# Patient Record
Sex: FEMALE | Race: WHITE | Employment: FULL TIME | URBAN - METROPOLITAN AREA
[De-identification: names, ages, dates, MRNs, and addresses within clinical notes are randomized per-mention and may not be internally consistent; named-entity substitution may affect disease eponyms.]

---

## 2019-03-07 ENCOUNTER — OFFICE VISIT (OUTPATIENT)
Dept: ORTHOPEDIC SURGERY | Age: 54
End: 2019-03-07
Payer: COMMERCIAL

## 2019-03-07 VITALS — WEIGHT: 215 LBS | HEIGHT: 67 IN | BODY MASS INDEX: 33.74 KG/M2

## 2019-03-07 DIAGNOSIS — T84.84XA PAIN DUE TO INTERNAL ORTHOPEDIC PROSTHETIC DEVICE, INITIAL ENCOUNTER (HCC): Primary | ICD-10-CM

## 2019-03-07 PROBLEM — N64.9 BREAST DISORDER: Status: ACTIVE | Noted: 2019-03-07

## 2019-03-07 PROBLEM — M79.10 MYALGIA: Status: ACTIVE | Noted: 2017-05-26

## 2019-03-07 PROBLEM — F32.A DEPRESSION: Status: ACTIVE | Noted: 2017-05-25

## 2019-03-07 PROBLEM — E03.9 HYPOTHYROIDISM: Status: ACTIVE | Noted: 2019-03-07

## 2019-03-07 PROBLEM — M54.9 BACKACHE: Status: ACTIVE | Noted: 2019-03-07

## 2019-03-07 PROBLEM — R06.09 DOE (DYSPNEA ON EXERTION): Status: ACTIVE | Noted: 2019-03-07

## 2019-03-07 PROBLEM — N83.209 CYST OF OVARY: Status: ACTIVE | Noted: 2017-03-07

## 2019-03-07 PROBLEM — K21.9 GASTROESOPHAGEAL REFLUX DISEASE: Status: ACTIVE | Noted: 2017-05-25

## 2019-03-07 PROBLEM — E11.9 DIABETES MELLITUS TYPE 2, CONTROLLED (HCC): Status: ACTIVE | Noted: 2019-03-07

## 2019-03-07 PROBLEM — M19.90 ARTHRITIS: Status: ACTIVE | Noted: 2019-03-07

## 2019-03-07 PROBLEM — R53.83 FATIGUE: Status: ACTIVE | Noted: 2017-05-26

## 2019-03-07 PROBLEM — N32.81 OVERACTIVE BLADDER: Status: ACTIVE | Noted: 2017-05-25

## 2019-03-07 PROBLEM — H54.7 VISUAL IMPAIRMENT: Status: ACTIVE | Noted: 2019-03-07

## 2019-03-07 PROBLEM — E89.0 POSTOPERATIVE HYPOTHYROIDISM: Status: ACTIVE | Noted: 2017-04-10

## 2019-03-07 PROBLEM — I26.99 PULMONARY EMBOLISM (HCC): Status: ACTIVE | Noted: 2017-03-01

## 2019-03-07 PROBLEM — Z86.711 HISTORY OF PULMONARY EMBOLISM: Status: ACTIVE | Noted: 2017-06-14

## 2019-03-07 PROBLEM — E55.9 VITAMIN D DEFICIENCY: Status: ACTIVE | Noted: 2017-06-26

## 2019-03-07 PROBLEM — M21.40 FLAT FOOT: Status: ACTIVE | Noted: 2017-06-14

## 2019-03-07 PROBLEM — Z86.19 HISTORY OF TICK-BORNE DISEASE: Status: ACTIVE | Noted: 2017-05-26

## 2019-03-07 PROBLEM — M17.11 PRIMARY OSTEOARTHRITIS OF RIGHT KNEE: Status: ACTIVE | Noted: 2018-11-21

## 2019-03-07 PROBLEM — I82.409 DEEP VEIN THROMBOSIS (HCC): Status: ACTIVE | Noted: 2017-03-01

## 2019-03-07 PROBLEM — Z86.718 HISTORY OF DEEP VENOUS THROMBOSIS: Status: ACTIVE | Noted: 2019-03-07

## 2019-03-07 PROBLEM — M25.50 PAIN IN JOINT: Status: ACTIVE | Noted: 2017-05-26

## 2019-03-07 PROBLEM — S83.241S ACUTE MEDIAL MENISCAL TEAR, RIGHT, SEQUELA: Status: ACTIVE | Noted: 2018-08-29

## 2019-03-07 PROBLEM — N92.0 MENORRHAGIA: Status: ACTIVE | Noted: 2019-03-07

## 2019-03-07 PROBLEM — H26.9 CATARACT: Status: ACTIVE | Noted: 2019-03-07

## 2019-03-07 PROBLEM — N63.0 BREAST NODULE: Status: ACTIVE | Noted: 2019-03-07

## 2019-03-07 PROBLEM — M25.561 RIGHT KNEE PAIN: Status: ACTIVE | Noted: 2019-03-07

## 2019-03-07 PROBLEM — D25.9 UTERINE LEIOMYOMA: Status: ACTIVE | Noted: 2017-03-07

## 2019-03-07 PROBLEM — S39.92XA INJURY OF BACK: Status: ACTIVE | Noted: 2019-03-07

## 2019-03-07 PROBLEM — G43.909 MIGRAINE HEADACHE: Status: ACTIVE | Noted: 2017-05-25

## 2019-03-07 PROBLEM — R20.0 NUMBNESS AND TINGLING: Status: ACTIVE | Noted: 2017-05-26

## 2019-03-07 PROBLEM — R00.2 PALPITATIONS: Status: ACTIVE | Noted: 2017-05-26

## 2019-03-07 PROBLEM — G47.19 EXCESSIVE DAYTIME SLEEPINESS: Status: ACTIVE | Noted: 2019-03-07

## 2019-03-07 PROBLEM — D64.9 ANEMIA: Status: ACTIVE | Noted: 2019-03-07

## 2019-03-07 PROBLEM — R20.2 NUMBNESS AND TINGLING: Status: ACTIVE | Noted: 2017-05-26

## 2019-03-07 PROCEDURE — 99204 OFFICE O/P NEW MOD 45 MIN: CPT | Performed by: ORTHOPAEDIC SURGERY

## 2019-03-07 RX ORDER — ASPIRIN 81 MG/1
81 TABLET, CHEWABLE ORAL
COMMUNITY

## 2019-03-07 RX ORDER — SERTRALINE HYDROCHLORIDE 100 MG/1
TABLET, FILM COATED ORAL
COMMUNITY

## 2019-03-07 RX ORDER — ESOMEPRAZOLE MAGNESIUM 40 MG/1
CAPSULE, DELAYED RELEASE ORAL
COMMUNITY
Start: 2017-05-25

## 2019-03-07 RX ORDER — BIOTIN 10000 MCG
1 CAPSULE ORAL
COMMUNITY

## 2019-03-07 RX ORDER — METFORMIN HYDROCHLORIDE 500 MG/1
TABLET, EXTENDED RELEASE ORAL
Refills: 0 | COMMUNITY
Start: 2019-02-22

## 2019-03-07 RX ORDER — TOPIRAMATE 50 MG/1
TABLET, FILM COATED ORAL
COMMUNITY

## 2019-03-07 RX ORDER — LEVOTHYROXINE SODIUM 175 UG/1
TABLET ORAL
COMMUNITY
Start: 2017-05-25

## 2019-03-07 SDOH — HEALTH STABILITY: MENTAL HEALTH: HOW OFTEN DO YOU HAVE A DRINK CONTAINING ALCOHOL?: NEVER

## 2019-03-11 LAB
ABSOLUTE IMMATURE GRANULOCYTE: 0 10*3/UL (ref 0–0.2)
BASOPHILS ABSOLUTE: 0.1 10*3/UL (ref 0–0.2)
BASOPHILS RELATIVE PERCENT: 1 % (ref 0–1)
C-REACTIVE PROTEIN: 3.2 MG/L (ref 0–7)
EOSINOPHILS ABSOLUTE: 0.4 10*3/UL (ref 0–0.5)
EOSINOPHILS RELATIVE PERCENT: 6.9 % (ref 0–6)
HCT VFR BLD CALC: 39.2 % (ref 36–45)
HEMOGLOBIN: 12.8 G/DL (ref 12–15)
IMMATURE GRANULOCYTES: 0.2 % (ref 0–1)
LYMPHOCYTES ABSOLUTE: 1.6 10*3/UL (ref 1.2–4)
LYMPHOCYTES RELATIVE PERCENT: 32.1 % (ref 20–45)
MCH RBC QN AUTO: 27.2 PG (ref 27–33)
MCHC RBC AUTO-ENTMCNC: 32.7 G/DL (ref 32–35)
MCV RBC AUTO: 83.4 FL (ref 82–98)
MONOCYTES ABSOLUTE: 0.4 10*3/UL (ref 0.1–1)
MONOCYTES RELATIVE PERCENT: 8.1 % (ref 5–12)
NEUTROPHILS ABSOLUTE: 2.6 10*3/UL (ref 1.6–7.6)
NEUTROPHILS RELATIVE PERCENT: 51.7 % (ref 40–72)
NUCLEATED RED BLOOD CELLS: 0 % (ref 0–0)
PDW BLD-RTO: 13.1 % (ref 11.5–15)
PLATELET # BLD: 201 10*3/UL (ref 150–400)
RBC: 4.7 10*6/UL (ref 3.8–5)
SEDIMENTATION RATE, ERYTHROCYTE: 12 MM/HR (ref 0–20)
WBC: 5.08 10*3/UL (ref 4–10.6)

## 2022-12-20 ENCOUNTER — ANESTHESIA EVENT (OUTPATIENT)
Dept: OPERATING ROOM | Age: 57
End: 2022-12-20
Payer: COMMERCIAL

## 2022-12-20 ENCOUNTER — HOSPITAL ENCOUNTER (OUTPATIENT)
Age: 57
Setting detail: OUTPATIENT SURGERY
Discharge: HOME | End: 2022-12-20
Attending: OPHTHALMOLOGY | Admitting: OPHTHALMOLOGY
Payer: COMMERCIAL

## 2022-12-20 ENCOUNTER — ANESTHESIA (OUTPATIENT)
Dept: OPERATING ROOM | Age: 57
End: 2022-12-20
Payer: COMMERCIAL

## 2022-12-20 VITALS
HEIGHT: 67 IN | DIASTOLIC BLOOD PRESSURE: 74 MMHG | HEART RATE: 74 BPM | RESPIRATION RATE: 16 BRPM | OXYGEN SATURATION: 96 % | SYSTOLIC BLOOD PRESSURE: 138 MMHG | TEMPERATURE: 97.2 F | WEIGHT: 220 LBS | BODY MASS INDEX: 34.53 KG/M2

## 2022-12-20 PROBLEM — H43.391 VITREOUS OPACITIES OF RIGHT EYE: Chronic | Status: ACTIVE | Noted: 2022-12-20

## 2022-12-20 LAB
EGFR, POC: >60 ML/MIN/1.73M2
GLUCOSE BLD-MCNC: 99 MG/DL (ref 74–100)
POC BUN: 26 MG/DL (ref 8–26)
POC CREATININE: 0.75 MG/DL (ref 0.51–1.19)

## 2022-12-20 PROCEDURE — 3700000000 HC ANESTHESIA ATTENDED CARE: Performed by: OPHTHALMOLOGY

## 2022-12-20 PROCEDURE — 82565 ASSAY OF CREATININE: CPT

## 2022-12-20 PROCEDURE — 2500000003 HC RX 250 WO HCPCS: Performed by: OPHTHALMOLOGY

## 2022-12-20 PROCEDURE — 2580000003 HC RX 258: Performed by: NURSE ANESTHETIST, CERTIFIED REGISTERED

## 2022-12-20 PROCEDURE — 6370000000 HC RX 637 (ALT 250 FOR IP): Performed by: OPHTHALMOLOGY

## 2022-12-20 PROCEDURE — 3700000001 HC ADD 15 MINUTES (ANESTHESIA): Performed by: OPHTHALMOLOGY

## 2022-12-20 PROCEDURE — 7100000011 HC PHASE II RECOVERY - ADDTL 15 MIN: Performed by: OPHTHALMOLOGY

## 2022-12-20 PROCEDURE — 6360000002 HC RX W HCPCS: Performed by: OPHTHALMOLOGY

## 2022-12-20 PROCEDURE — 2580000003 HC RX 258: Performed by: OPHTHALMOLOGY

## 2022-12-20 PROCEDURE — 7100000010 HC PHASE II RECOVERY - FIRST 15 MIN: Performed by: OPHTHALMOLOGY

## 2022-12-20 PROCEDURE — 2500000003 HC RX 250 WO HCPCS: Performed by: NURSE ANESTHETIST, CERTIFIED REGISTERED

## 2022-12-20 PROCEDURE — 2580000003 HC RX 258: Performed by: STUDENT IN AN ORGANIZED HEALTH CARE EDUCATION/TRAINING PROGRAM

## 2022-12-20 PROCEDURE — 6360000002 HC RX W HCPCS: Performed by: NURSE ANESTHETIST, CERTIFIED REGISTERED

## 2022-12-20 PROCEDURE — 84520 ASSAY OF UREA NITROGEN: CPT

## 2022-12-20 PROCEDURE — 2709999900 HC NON-CHARGEABLE SUPPLY: Performed by: OPHTHALMOLOGY

## 2022-12-20 PROCEDURE — 82947 ASSAY GLUCOSE BLOOD QUANT: CPT

## 2022-12-20 PROCEDURE — 3600000004 HC SURGERY LEVEL 4 BASE: Performed by: OPHTHALMOLOGY

## 2022-12-20 PROCEDURE — 3600000014 HC SURGERY LEVEL 4 ADDTL 15MIN: Performed by: OPHTHALMOLOGY

## 2022-12-20 RX ORDER — SODIUM CHLORIDE 0.9 % (FLUSH) 0.9 %
5-40 SYRINGE (ML) INJECTION PRN
Status: DISCONTINUED | OUTPATIENT
Start: 2022-12-20 | End: 2022-12-20 | Stop reason: HOSPADM

## 2022-12-20 RX ORDER — SODIUM CHLORIDE, SODIUM LACTATE, POTASSIUM CHLORIDE, CALCIUM CHLORIDE 600; 310; 30; 20 MG/100ML; MG/100ML; MG/100ML; MG/100ML
INJECTION, SOLUTION INTRAVENOUS CONTINUOUS PRN
Status: DISCONTINUED | OUTPATIENT
Start: 2022-12-20 | End: 2022-12-20 | Stop reason: SDUPTHER

## 2022-12-20 RX ORDER — TOBRAMYCIN AND DEXAMETHASONE 3; 1 MG/ML; MG/ML
1 SUSPENSION/ DROPS OPHTHALMIC ONCE
Status: COMPLETED | OUTPATIENT
Start: 2022-12-20 | End: 2022-12-20

## 2022-12-20 RX ORDER — WATER 1000 ML/1000ML
INJECTION, SOLUTION INTRAVENOUS PRN
Status: DISCONTINUED | OUTPATIENT
Start: 2022-12-20 | End: 2022-12-20 | Stop reason: HOSPADM

## 2022-12-20 RX ORDER — FENTANYL CITRATE 50 UG/ML
INJECTION, SOLUTION INTRAMUSCULAR; INTRAVENOUS PRN
Status: DISCONTINUED | OUTPATIENT
Start: 2022-12-20 | End: 2022-12-20 | Stop reason: SDUPTHER

## 2022-12-20 RX ORDER — SODIUM CHLORIDE, SODIUM LACTATE, POTASSIUM CHLORIDE, CALCIUM CHLORIDE 600; 310; 30; 20 MG/100ML; MG/100ML; MG/100ML; MG/100ML
INJECTION, SOLUTION INTRAVENOUS CONTINUOUS
Status: DISCONTINUED | OUTPATIENT
Start: 2022-12-20 | End: 2022-12-20 | Stop reason: HOSPADM

## 2022-12-20 RX ORDER — TROPICAMIDE 10 MG/ML
1 SOLUTION/ DROPS OPHTHALMIC EVERY 5 MIN PRN
Status: COMPLETED | OUTPATIENT
Start: 2022-12-20 | End: 2022-12-20

## 2022-12-20 RX ORDER — PHENYLEPHRINE HYDROCHLORIDE 100 MG/ML
1 SOLUTION/ DROPS OPHTHALMIC EVERY 5 MIN PRN
Status: COMPLETED | OUTPATIENT
Start: 2022-12-20 | End: 2022-12-20

## 2022-12-20 RX ORDER — BALANCED SALT SOLUTION ENRICHED WITH BICARBONATE, DEXTROSE, AND GLUTATHIONE
KIT INTRAOCULAR PRN
Status: DISCONTINUED | OUTPATIENT
Start: 2022-12-20 | End: 2022-12-20 | Stop reason: HOSPADM

## 2022-12-20 RX ORDER — LIDOCAINE HYDROCHLORIDE 10 MG/ML
INJECTION, SOLUTION INFILTRATION; PERINEURAL PRN
Status: DISCONTINUED | OUTPATIENT
Start: 2022-12-20 | End: 2022-12-20 | Stop reason: SDUPTHER

## 2022-12-20 RX ORDER — ONDANSETRON 2 MG/ML
4 INJECTION INTRAMUSCULAR; INTRAVENOUS
Status: DISCONTINUED | OUTPATIENT
Start: 2022-12-20 | End: 2022-12-20 | Stop reason: HOSPADM

## 2022-12-20 RX ORDER — SODIUM CHLORIDE 9 MG/ML
INJECTION, SOLUTION INTRAVENOUS PRN
Status: DISCONTINUED | OUTPATIENT
Start: 2022-12-20 | End: 2022-12-20 | Stop reason: HOSPADM

## 2022-12-20 RX ORDER — SODIUM CHLORIDE 0.9 % (FLUSH) 0.9 %
5-40 SYRINGE (ML) INJECTION EVERY 12 HOURS SCHEDULED
Status: DISCONTINUED | OUTPATIENT
Start: 2022-12-20 | End: 2022-12-20 | Stop reason: HOSPADM

## 2022-12-20 RX ORDER — MIDAZOLAM HYDROCHLORIDE 1 MG/ML
INJECTION INTRAMUSCULAR; INTRAVENOUS PRN
Status: DISCONTINUED | OUTPATIENT
Start: 2022-12-20 | End: 2022-12-20 | Stop reason: SDUPTHER

## 2022-12-20 RX ORDER — PROPOFOL 10 MG/ML
INJECTION, EMULSION INTRAVENOUS PRN
Status: DISCONTINUED | OUTPATIENT
Start: 2022-12-20 | End: 2022-12-20 | Stop reason: SDUPTHER

## 2022-12-20 RX ORDER — HYDRALAZINE HYDROCHLORIDE 20 MG/ML
10 INJECTION INTRAMUSCULAR; INTRAVENOUS
Status: DISCONTINUED | OUTPATIENT
Start: 2022-12-20 | End: 2022-12-20 | Stop reason: HOSPADM

## 2022-12-20 RX ORDER — CYCLOPENTOLATE HYDROCHLORIDE 10 MG/ML
SOLUTION/ DROPS OPHTHALMIC PRN
Status: DISCONTINUED | OUTPATIENT
Start: 2022-12-20 | End: 2022-12-20 | Stop reason: HOSPADM

## 2022-12-20 RX ORDER — ERYTHROMYCIN 5 MG/G
OINTMENT OPHTHALMIC PRN
Status: DISCONTINUED | OUTPATIENT
Start: 2022-12-20 | End: 2022-12-20 | Stop reason: HOSPADM

## 2022-12-20 RX ORDER — CEFTAZIDIME 1 G/1
INJECTION, POWDER, FOR SOLUTION INTRAMUSCULAR; INTRAVENOUS PRN
Status: DISCONTINUED | OUTPATIENT
Start: 2022-12-20 | End: 2022-12-20 | Stop reason: HOSPADM

## 2022-12-20 RX ADMIN — PHENYLEPHRINE HYDROCHLORIDE 1 DROP: 100 SOLUTION/ DROPS OPHTHALMIC at 08:28

## 2022-12-20 RX ADMIN — SODIUM CHLORIDE, POTASSIUM CHLORIDE, SODIUM LACTATE AND CALCIUM CHLORIDE: 600; 310; 30; 20 INJECTION, SOLUTION INTRAVENOUS at 08:50

## 2022-12-20 RX ADMIN — PHENYLEPHRINE HYDROCHLORIDE 1 DROP: 100 SOLUTION/ DROPS OPHTHALMIC at 08:46

## 2022-12-20 RX ADMIN — MIDAZOLAM 1 MG: 1 INJECTION INTRAMUSCULAR; INTRAVENOUS at 09:56

## 2022-12-20 RX ADMIN — TROPICAMIDE 1 DROP: 10 SOLUTION/ DROPS OPHTHALMIC at 08:36

## 2022-12-20 RX ADMIN — TROPICAMIDE 1 DROP: 10 SOLUTION/ DROPS OPHTHALMIC at 08:46

## 2022-12-20 RX ADMIN — TROPICAMIDE 1 DROP: 10 SOLUTION/ DROPS OPHTHALMIC at 08:28

## 2022-12-20 RX ADMIN — FENTANYL CITRATE 50 MCG: 50 INJECTION, SOLUTION INTRAMUSCULAR; INTRAVENOUS at 09:56

## 2022-12-20 RX ADMIN — PROPOFOL 50 MG: 10 INJECTION, EMULSION INTRAVENOUS at 09:44

## 2022-12-20 RX ADMIN — FENTANYL CITRATE 50 MCG: 50 INJECTION, SOLUTION INTRAMUSCULAR; INTRAVENOUS at 10:04

## 2022-12-20 RX ADMIN — SODIUM CHLORIDE, POTASSIUM CHLORIDE, SODIUM LACTATE AND CALCIUM CHLORIDE: 600; 310; 30; 20 INJECTION, SOLUTION INTRAVENOUS at 09:39

## 2022-12-20 RX ADMIN — PHENYLEPHRINE HYDROCHLORIDE 1 DROP: 100 SOLUTION/ DROPS OPHTHALMIC at 08:36

## 2022-12-20 RX ADMIN — LIDOCAINE HYDROCHLORIDE 50 MG: 10 INJECTION, SOLUTION INFILTRATION; PERINEURAL at 09:44

## 2022-12-20 RX ADMIN — TOBRAMYCIN AND DEXAMETHASONE 1 DROP: 1; 3 SUSPENSION/ DROPS OPHTHALMIC at 08:46

## 2022-12-20 ASSESSMENT — PAIN - FUNCTIONAL ASSESSMENT: PAIN_FUNCTIONAL_ASSESSMENT: 0-10

## 2022-12-20 NOTE — ANESTHESIA PRE PROCEDURE
Department of Anesthesiology  Preprocedure Note       Name:  Nitin Swift   Age:  59 y.o.  :  1965                                          MRN:  [id number]         Date:  2022      Surgeon: Brandon Christie):  Maribell Anand MD    Procedure: Procedure(s):  VITRECTOMY 25 GAUGE    Medications prior to admission:   Prior to Admission medications    Medication Sig Start Date End Date Taking? Authorizing Provider   celecoxib (CELEBREX) 200 MG capsule Take 200 mg by mouth daily 22   Historical Provider, MD   citalopram (CELEXA) 40 MG tablet Take 40 mg by mouth daily    Historical Provider, MD   sulfaSALAzine (AZULFIDINE) 500 MG tablet Take 500 mg by mouth 4 times daily    Historical Provider, MD   DULoxetine (CYMBALTA) 30 MG extended release capsule Take 30 mg by mouth daily    Historical Provider, MD   aspirin 81 MG chewable tablet Take 162 mg by mouth daily    Historical Provider, MD   Biotin 10 MG CAPS Take 1 capsule by mouth  Patient not taking: Reported on 2022    Historical Provider, MD   esomeprazole (NEXIUM) 40 MG delayed release capsule Nexium 40 mg capsule,delayed release   Take 1 capsule every day by oral route. 17   Historical Provider, MD   levothyroxine (SYNTHROID) 150 MCG tablet Take 150 mcg by mouth Daily 17   Historical Provider, MD   metFORMIN (GLUCOPHAGE-XR) 500 MG extended release tablet  19   Historical Provider, MD   sertraline (ZOLOFT) 100 MG tablet Zoloft 100 mg tablet   Take 1 tablet every day by oral route. Patient not taking: Reported on 2022    Historical Provider, MD   topiramate (TOPAMAX) 50 MG tablet Topamax 50 mg tablet   Take 1 tablet twice a day by oral route. Historical Provider, MD       Current medications:    No current facility-administered medications for this encounter.      Current Outpatient Medications   Medication Sig Dispense Refill    celecoxib (CELEBREX) 200 MG capsule Take 200 mg by mouth daily      citalopram (CELEXA) 40 MG tablet Take 40 mg by mouth daily      sulfaSALAzine (AZULFIDINE) 500 MG tablet Take 500 mg by mouth 4 times daily      DULoxetine (CYMBALTA) 30 MG extended release capsule Take 30 mg by mouth daily      aspirin 81 MG chewable tablet Take 162 mg by mouth daily      Biotin 10 MG CAPS Take 1 capsule by mouth (Patient not taking: Reported on 12/16/2022)      esomeprazole (NEXIUM) 40 MG delayed release capsule Nexium 40 mg capsule,delayed release   Take 1 capsule every day by oral route.  levothyroxine (SYNTHROID) 150 MCG tablet Take 150 mcg by mouth Daily      metFORMIN (GLUCOPHAGE-XR) 500 MG extended release tablet  (Patient not taking: Reported on 12/16/2022)  0    sertraline (ZOLOFT) 100 MG tablet Zoloft 100 mg tablet   Take 1 tablet every day by oral route. (Patient not taking: Reported on 12/16/2022)      topiramate (TOPAMAX) 50 MG tablet Topamax 50 mg tablet   Take 1 tablet twice a day by oral route. Allergies:  No Known Allergies    Problem List:    Patient Active Problem List   Diagnosis Code    Acute medial meniscal tear, right, sequela S83.241S    Acute pain of right shoulder M25.511    Anemia D64.9    Arthritis M19.90    Backache M54.9    Breast disorder N64.9    Breast nodule N63.0    Calculus of gallbladder with cholecystitis K80.10    Cataract H26.9    Cyst of ovary N83.209    Deep vein thrombosis ([facility] [address] I82.409    Depression F32. A    Diabetes mellitus type 2, controlled ([facility] [address] E11.9    NICOLAS (dyspnea on exertion) R06.09    Eczema L30.9    Excessive daytime sleepiness G47.19    Fatigue R53.83    Flat foot M21.40    Gastroesophageal reflux disease K21.9    History of deep venous thrombosis Z86.718    History of pulmonary embolism Z86.711    History of tick-borne disease Z86.19    Hypothyroidism E03.9    Impingement syndrome of right shoulder M75.41    Injury of back S39. 92XA    Menorrhagia N92.0    Migraine headache G43.909    Pain in joint M25.50    Myalgia M79.10    Nausea R11.0    Numbness and tingling R20.0, R20.2    Obesity E66.9    Overactive bladder N32.81    Palpitations R00.2    Postoperative hypothyroidism E89.0    Primary osteoarthritis of right knee M17.11    Pulmonary embolism (HCC) I26.99    Right knee pain M25.561    Right upper quadrant pain R10.11    Subacromial bursitis M75.50    Tendinopathy of right rotator cuff M67.911    Uterine leiomyoma D25.9    Visual impairment H54.7    Vitamin D deficiency E55.9       Past Medical History:        Diagnosis Date    Acid reflux     Arthritis     Back pain     Breast nodule     breast tag    Bursitis of shoulder region     bilat    Carpal tunnel syndrome     bilat    Cataract     Depression     Floaters     History of [place] spotted fever 1995    tick borne illness    Hx of blood clots 2015    DVT and PE after surgery    Leg pain     Migraine     ALO on CPAP 11/23/2022    has not used CPAP for past month    Ovarian cyst 2015    Overactive bladder     Spinal stenosis     Thyroid disease     Torn meniscus     Under care of service provider 12/16/2022    rheumatology-[facility]-last visit april 2022    Under care of service provider 12/16/2022    [facility]-last visit sep 2022    Under care of service provider 12/16/2022    vascular-[facility]-last visit oct 2022    Under care of service provider 12/16/2022    ortho-[facility]-last visit oct 2022    Uterine leiomyoma 2015    Venous insufficiency     Visual disturbance     cloudy vision       Past Surgical History:        Procedure Laterality Date    CATARACT REMOVAL  2021    CHOLECYSTECTOMY  2013    COLONOSCOPY      ENDOSCOPY, COLON, DIAGNOSTIC      JOINT REPLACEMENT Right 2018    knee    JOINT REPLACEMENT Left 05/2020    knee    KNEE CARTILAGE SURGERY Right 2018    repair of torn meniscus    KNEE CARTILAGE SURGERY Left 11/2019    torn meniscus    SINUS SURGERY  1999    THYROIDECTOMY  2010    TUBAL LIGATION  1993    UTERINE FIBROID EMBOLIZATION  2015       Social History:    Social History     Tobacco Use    Smoking status: Never    Smokeless tobacco: Never   Substance Use Topics    Alcohol use: Not Currently                                Counseling given: Not Answered      Vital Signs (Current):   Vitals:    12/16/22 1035   Weight: 220 lb (99.8 kg)   Height: 5' 7\" (1.702 m)                                              BP Readings from Last 3 Encounters:   No data found for BP       NPO Status:                                                                                 BMI:   Wt Readings from Last 3 Encounters:   03/07/19 215 lb (97.5 kg)     Body mass index is 34.46 kg/m². CBC:   Lab Results   Component Value Date/Time    WBC 4.40 05/11/2020 02:08 PM    RBC 4.83 05/11/2020 02:08 PM    HGB 13.2 05/11/2020 02:08 PM    HCT 41.6 05/11/2020 02:08 PM    MCV 86.1 05/11/2020 02:08 PM    RDW 12.5 05/11/2020 02:08 PM     05/11/2020 02:08 PM       CMP:   Lab Results   Component Value Date/Time     05/11/2020 02:08 PM    K 3.9 05/11/2020 02:08 PM     05/11/2020 02:08 PM    CO2 22 05/11/2020 02:08 PM    BUN 10 05/11/2020 02:08 PM    CREATININE 0.89 05/11/2020 02:08 PM    GLUCOSE 99 05/11/2020 02:08 PM    PROT 6.9 10/18/2019 02:38 PM    CALCIUM 9.2 05/11/2020 02:08 PM    BILITOT 0.6 10/18/2019 02:38 PM    ALKPHOS 85 10/18/2019 02:38 PM    AST 19 10/18/2019 02:38 PM    ALT 18 10/18/2019 02:38 PM       POC Tests: No results for input(s): POCGLU, POCNA, POCK, POCCL, POCBUN, POCHEMO, POCHCT in the last 72 hours.     Coags:   Lab Results   Component Value Date/Time    PROTIME 13.4 05/11/2020 02:08 PM    INR 1.02 05/11/2020 02:08 PM    APTT 25.4 05/11/2020 02:08 PM       HCG (If Applicable): No results found for: PREGTESTUR, PREGSERUM, HCG, HCGQUANT     ABGs: No results found for: PHART, PO2ART, OGT4NRU, HDP1TWO, BEART, Z6WSAZYX     Type & Screen (If Applicable):  No results found for: LABABO, LABRH    Drug/Infectious Status (If Applicable):  No results found for: HIV, HEPCAB    COVID-19 Screening (If Applicable):   Lab Results   Component Value Date/Time    COVID19 Not Detected 05/11/2020 02:07 PM           Anesthesia Evaluation  Patient summary reviewed and Nursing notes reviewed no history of anesthetic complications:   Airway: Mallampati: II  TM distance: >3 FB   Neck ROM: full  Mouth opening: > = 3 FB   Dental: normal exam         Pulmonary: breath sounds clear to auscultation  (+) sleep apnea:                             Cardiovascular:            Rhythm: regular  Rate: normal                    Neuro/Psych:   (+) neuromuscular disease:, headaches:, psychiatric history:            GI/Hepatic/Renal:   (+) GERD:,           Endo/Other:    (+) hypothyroidism::., .                 Abdominal:             Vascular:   + DVT (hx of), PE (hx of). Other Findings:           Anesthesia Plan      MAC     ASA 3       Induction: intravenous. Anesthetic plan and risks discussed with patient. Plan discussed with CRNA.                     Murphy Shields MD   12/20/2022

## 2022-12-20 NOTE — OP NOTE
Operative Note      Patient: Radha Guzmán  YOB: 1965  MRN: [id number]    Date of Procedure: 12/20/2022      PREOP Diagnosis:  Vitreous Opacities OD    POSTOP Diagnosis:  Same    Procedure:  Vitrectomy OD    Anesthesia:  MAC    Surgeon:  Jane Raygoza    EBL:  Minimal    Fluids:  See anesthesia record    Drains:  None    Specimen:  None    Complications:  None    Reason for operation:  The patient has significant vitreous opacities that are interfering with daily activities. Patient wishes to have surgery to remove opacities in anticipation of better vision. Patient understands risks of surgery include, but are not limited to, bleeding, infection and retinal detachment. Procedure: The patient was brought to the operating room in good condition. Transient Propofol was given. Retrobulbar and topical anaesthesia were administered. The patient was prepped and draped in the usual manner. 25 gauge vitrectomy trocars were inserted in the usual quadrants. Infusion was inserted in the inferior temporal quadrant. Light pipe and ocutome placed through the superior trocars. Vitreous was removed from anterior to posterior and trimmed out past the equator in all quadrants. No significant bleeding occurred at any time. No tears or detachment were found with scleral depression. The trocars were removed and sutured if there any leakage. Subtenon's antibiotic was injected in the inferior nasal quadrant. The eye was patched in the usual fashion and the patient taken to the recovery room in good condition.     Electronically signed by Sangeeta Adame MD on 12/20/2022 at 10:36 AM

## 2022-12-20 NOTE — H&P (VIEW-ONLY)
History and Physical    Pt Name: Karyna Atkinson  MRN: [id number]  YOB: 1965  Date of evaluation: 12/20/2022  Primary Care Physician: Aidee Foss DO    SUBJECTIVE:   History of Chief Complaint:    Karyna Atkinson is a 59 y.o. female who is scheduled today for VITRECTOMY 25 GAUGE - Right. Patient reports visualizing a \"fish hook\" type of appearance to her right eye, and \"backwards C\" appearance to her left eye since June of this year, as well as blurred vision to her right eye. Patient reports history of bilateral cataract surgery December 2021. Allergies  has No Known Allergies. Medications  Prior to Admission medications    Medication Sig Start Date End Date Taking? Authorizing Provider   celecoxib (CELEBREX) 200 MG capsule Take 200 mg by mouth daily 9/2/22   Historical Provider, MD   citalopram (CELEXA) 40 MG tablet Take 40 mg by mouth daily    Historical Provider, MD   sulfaSALAzine (AZULFIDINE) 500 MG tablet Take 500 mg by mouth 4 times daily    Historical Provider, MD   DULoxetine (CYMBALTA) 30 MG extended release capsule Take 30 mg by mouth daily    Historical Provider, MD   aspirin 81 MG chewable tablet Take 162 mg by mouth daily    Historical Provider, MD   Biotin 10 MG CAPS Take 1 capsule by mouth  Patient not taking: Reported on 12/16/2022    Historical Provider, MD   esomeprazole (NEXIUM) 40 MG delayed release capsule Nexium 40 mg capsule,delayed release   Take 1 capsule every day by oral route. 5/25/17   Historical Provider, MD   levothyroxine (SYNTHROID) 150 MCG tablet Take 150 mcg by mouth Daily 5/25/17   Historical Provider, MD   metFORMIN (GLUCOPHAGE-XR) 500 MG extended release tablet  2/22/19   Historical Provider, MD   sertraline (ZOLOFT) 100 MG tablet Zoloft 100 mg tablet   Take 1 tablet every day by oral route.   Patient not taking: Reported on 12/16/2022    Historical Provider, MD   topiramate (TOPAMAX) 50 MG tablet Topamax 50 mg tablet   Take 1 tablet twice a day by oral route.    Historical Provider, MD     Past Medical History    has a past medical history of Acid reflux, Arthritis, Back pain, Breast nodule, Bursitis of shoulder region, Carpal tunnel syndrome, Cataract, Depression, Floaters, History of [facility] spotted fever, Hx of blood clots, Leg pain, Migraine, ALO on CPAP, Ovarian cyst, Overactive bladder, Spinal stenosis, Thyroid disease, Torn meniscus, Under care of service provider, Under care of service provider, Under care of service provider, Under care of service provider, Uterine leiomyoma, Venous insufficiency, and Visual disturbance. Past Surgical History   has a past surgical history that includes Knee cartilage surgery (Right, 2018); Cholecystectomy (); joint replacement (Right, 2018); Knee cartilage surgery (Left, 2019); joint replacement (Left, 2020); Thyroidectomy (); sinus surgery (); Tubal ligation (); Cataract removal (); Uterine fibroid embolization (); Colonoscopy; and Endoscopy, colon, diagnostic. Social History   reports that she has never smoked. She has never used smokeless tobacco.   reports that she does not currently use alcohol. reports no history of drug use. Marital Status    Children 3  Occupation    Family History  Family Status   Relation Name Status    Mother      Father       family history includes Cancer in her father; Diabetes in her mother; Heart Disease in her mother; High Blood Pressure in her mother.     Review of Systems:  CONSTITUTIONAL:   negative for fevers, chills, fatigue and malaise    EYES:   negative for double vision, see HPI    HEENT:   negative for tinnitus, epistaxis and sore throat     RESPIRATORY:   negative for cough, shortness of breath, wheezing     CARDIOVASCULAR:   negative for chest pain, palpitations, syncope, edema     GASTROINTESTINAL:   negative for nausea, vomiting     GENITOURINARY:   negative for incontinence     MUSCULOSKELETAL: negative for neck or back pain     NEUROLOGICAL:   Negative for weakness and tingling  negative for headaches and dizziness     PSYCHIATRIC:   negative for anxiety       OBJECTIVE:   VITALS:  height is 5' 7\" (1.702 m) and weight is 220 lb (99.8 kg). Her temporal temperature is 97.3 °F (36.3 °C). Her blood pressure is 126/86 and her pulse is 70. Her respiration is 16 and oxygen saturation is 95%. CONSTITUTIONAL:alert & oriented x 3, no acute distress. Calm and pleasant. SKIN:  Warm and dry, no rashes to exposed areas of skin. HEAD:  Normocephalic, atraumatic. EYES: PERRL. EOMs intact. EARS:  Intact and equal bilaterally. No edema or thickening, without lumps, lesions, or discharge. Hearing grossly WNL. NOSE:  Nares patent. No rhinorrhea. MOUTH/THROAT:  Mucous membranes pink and moist, uvula midline, teeth appear to be intact. NECK: Supple, no lymphadenopathy. LUNGS: Respirations even and non-labored. Clear to auscultation bilaterally, no wheezes, rales, or rhonchi. CARDIOVASCULAR: Regular rate and rhythm, no murmurs/rubs/gallops. ABDOMEN: soft, non-tender and non-distended, bowel sounds active x 4. EXTREMITIES: 1+ edema to bilateral lower extremities. No varicosities to bilateral lower extremities. NEUROLOGIC: CN II-XII are grossly intact. Gait not assessed. IMPRESSIONS:   VITREOUS OPACITIES. PLANS:   VITRECTOMY 25 GAUGE - Right.     CAITY Metcalf CNP   Electronically signed 12/20/2022 at 8:52 AM

## 2022-12-20 NOTE — ANESTHESIA POSTPROCEDURE EVALUATION
Department of Anesthesiology  Postprocedure Note    Patient: Gaetano Guevara  MRN: [id number]  YOB: 1965  Date of evaluation: 12/20/2022      Procedure Summary     Date: 12/20/22 Room / Location: [address]    Anesthesia Start: 4300 Anesthesia Stop: 1704    Procedure: VITRECTOMY 25 GAUGE (Right) Diagnosis:       Vitreous opacities      (VITREOUS OPACITIES)    Surgeons: Tricia Gao MD Responsible Provider: Shawanda Hooper MD    Anesthesia Type: MAC ASA Status: 3          Anesthesia Type: No value filed.     Dago Phase I: Dago Score: 10    Dago Phase II: Dago Score: 10      Anesthesia Post Evaluation    Patient location during evaluation: bedside  Patient participation: complete - patient participated  Level of consciousness: awake  Airway patency: patent  Nausea & Vomiting: no nausea and no vomiting  Complications: no  Cardiovascular status: blood pressure returned to baseline  Respiratory status: acceptable  Hydration status: euvolemic  Comments: BP (!) 141/79   Pulse 73   Temp 97.9 °F (36.6 °C) (Temporal)   Resp 14   Ht 5' 7\" (1.702 m)   Wt 220 lb (99.8 kg)   SpO2 98%   BMI 34.46 kg/m²

## 2022-12-20 NOTE — H&P
History and Physical    Pt Name: Santiago Delgado  MRN: [id number]  YOB: 1965  Date of evaluation: 12/20/2022  Primary Care Physician: Dionne Bernheim, DO    SUBJECTIVE:   History of Chief Complaint:    Santiago Delgado is a 59 y.o. female who is scheduled today for VITRECTOMY 25 GAUGE - Right. Patient reports visualizing a \"fish hook\" type of appearance to her right eye, and \"backwards C\" appearance to her left eye since June of this year, as well as blurred vision to her right eye. Patient reports history of bilateral cataract surgery December 2021. Allergies  has No Known Allergies. Medications  Prior to Admission medications    Medication Sig Start Date End Date Taking? Authorizing Provider   celecoxib (CELEBREX) 200 MG capsule Take 200 mg by mouth daily 9/2/22   Historical Provider, MD   citalopram (CELEXA) 40 MG tablet Take 40 mg by mouth daily    Historical Provider, MD   sulfaSALAzine (AZULFIDINE) 500 MG tablet Take 500 mg by mouth 4 times daily    Historical Provider, MD   DULoxetine (CYMBALTA) 30 MG extended release capsule Take 30 mg by mouth daily    Historical Provider, MD   aspirin 81 MG chewable tablet Take 162 mg by mouth daily    Historical Provider, MD   Biotin 10 MG CAPS Take 1 capsule by mouth  Patient not taking: Reported on 12/16/2022    Historical Provider, MD   esomeprazole (NEXIUM) 40 MG delayed release capsule Nexium 40 mg capsule,delayed release   Take 1 capsule every day by oral route. 5/25/17   Historical Provider, MD   levothyroxine (SYNTHROID) 150 MCG tablet Take 150 mcg by mouth Daily 5/25/17   Historical Provider, MD   metFORMIN (GLUCOPHAGE-XR) 500 MG extended release tablet  2/22/19   Historical Provider, MD   sertraline (ZOLOFT) 100 MG tablet Zoloft 100 mg tablet   Take 1 tablet every day by oral route.   Patient not taking: Reported on 12/16/2022    Historical Provider, MD   topiramate (TOPAMAX) 50 MG tablet Topamax 50 mg tablet   Take 1 tablet twice a day by oral route.    Historical Provider, MD     Past Medical History    has a past medical history of Acid reflux, Arthritis, Back pain, Breast nodule, Bursitis of shoulder region, Carpal tunnel syndrome, Cataract, Depression, Floaters, History of [facility] spotted fever, Hx of blood clots, Leg pain, Migraine, ALO on CPAP, Ovarian cyst, Overactive bladder, Spinal stenosis, Thyroid disease, Torn meniscus, Under care of service provider, Under care of service provider, Under care of service provider, Under care of service provider, Uterine leiomyoma, Venous insufficiency, and Visual disturbance. Past Surgical History   has a past surgical history that includes Knee cartilage surgery (Right, 2018); Cholecystectomy (); joint replacement (Right, 2018); Knee cartilage surgery (Left, 2019); joint replacement (Left, 2020); Thyroidectomy (); sinus surgery (); Tubal ligation (); Cataract removal (); Uterine fibroid embolization (); Colonoscopy; and Endoscopy, colon, diagnostic. Social History   reports that she has never smoked. She has never used smokeless tobacco.   reports that she does not currently use alcohol. reports no history of drug use. Marital Status    Children 3  Occupation    Family History  Family Status   Relation Name Status    Mother      Father       family history includes Cancer in her father; Diabetes in her mother; Heart Disease in her mother; High Blood Pressure in her mother.     Review of Systems:  CONSTITUTIONAL:   negative for fevers, chills, fatigue and malaise    EYES:   negative for double vision, see HPI    HEENT:   negative for tinnitus, epistaxis and sore throat     RESPIRATORY:   negative for cough, shortness of breath, wheezing     CARDIOVASCULAR:   negative for chest pain, palpitations, syncope, edema     GASTROINTESTINAL:   negative for nausea, vomiting     GENITOURINARY:   negative for incontinence     MUSCULOSKELETAL: negative for neck or back pain     NEUROLOGICAL:   Negative for weakness and tingling  negative for headaches and dizziness     PSYCHIATRIC:   negative for anxiety       OBJECTIVE:   VITALS:  height is 5' 7\" (1.702 m) and weight is 220 lb (99.8 kg). Her temporal temperature is 97.3 °F (36.3 °C). Her blood pressure is 126/86 and her pulse is 70. Her respiration is 16 and oxygen saturation is 95%. CONSTITUTIONAL:alert & oriented x 3, no acute distress. Calm and pleasant. SKIN:  Warm and dry, no rashes to exposed areas of skin. HEAD:  Normocephalic, atraumatic. EYES: PERRL. EOMs intact. EARS:  Intact and equal bilaterally. No edema or thickening, without lumps, lesions, or discharge. Hearing grossly WNL. NOSE:  Nares patent. No rhinorrhea. MOUTH/THROAT:  Mucous membranes pink and moist, uvula midline, teeth appear to be intact. NECK: Supple, no lymphadenopathy. LUNGS: Respirations even and non-labored. Clear to auscultation bilaterally, no wheezes, rales, or rhonchi. CARDIOVASCULAR: Regular rate and rhythm, no murmurs/rubs/gallops. ABDOMEN: soft, non-tender and non-distended, bowel sounds active x 4. EXTREMITIES: 1+ edema to bilateral lower extremities. No varicosities to bilateral lower extremities. NEUROLOGIC: CN II-XII are grossly intact. Gait not assessed. IMPRESSIONS:   VITREOUS OPACITIES. PLANS:   VITRECTOMY 25 GAUGE - Right.     CAITY Saez CNP   Electronically signed 12/20/2022 at 8:52 AM

## 2023-01-03 ENCOUNTER — HOSPITAL ENCOUNTER (OUTPATIENT)
Age: 58
Setting detail: OUTPATIENT SURGERY
Discharge: HOME | End: 2023-01-03
Attending: OPHTHALMOLOGY | Admitting: OPHTHALMOLOGY
Payer: COMMERCIAL

## 2023-01-03 ENCOUNTER — ANESTHESIA (OUTPATIENT)
Dept: OPERATING ROOM | Age: 58
End: 2023-01-03
Payer: COMMERCIAL

## 2023-01-03 ENCOUNTER — ANESTHESIA EVENT (OUTPATIENT)
Dept: OPERATING ROOM | Age: 58
End: 2023-01-03
Payer: COMMERCIAL

## 2023-01-03 VITALS
DIASTOLIC BLOOD PRESSURE: 86 MMHG | SYSTOLIC BLOOD PRESSURE: 139 MMHG | RESPIRATION RATE: 16 BRPM | HEART RATE: 75 BPM | OXYGEN SATURATION: 98 % | WEIGHT: 220 LBS | TEMPERATURE: 97.5 F | BODY MASS INDEX: 34.53 KG/M2 | HEIGHT: 67 IN

## 2023-01-03 PROBLEM — H43.393 VITREOUS OPACITIES OF BOTH EYES: Status: ACTIVE | Noted: 2022-12-20

## 2023-01-03 PROCEDURE — 3600000004 HC SURGERY LEVEL 4 BASE: Performed by: OPHTHALMOLOGY

## 2023-01-03 PROCEDURE — 6360000002 HC RX W HCPCS: Performed by: NURSE ANESTHETIST, CERTIFIED REGISTERED

## 2023-01-03 PROCEDURE — 7100000041 HC SPAR PHASE II RECOVERY - ADDTL 15 MIN: Performed by: OPHTHALMOLOGY

## 2023-01-03 PROCEDURE — 7100000040 HC SPAR PHASE II RECOVERY - FIRST 15 MIN: Performed by: OPHTHALMOLOGY

## 2023-01-03 PROCEDURE — 2500000003 HC RX 250 WO HCPCS: Performed by: OPHTHALMOLOGY

## 2023-01-03 PROCEDURE — 6360000002 HC RX W HCPCS: Performed by: OPHTHALMOLOGY

## 2023-01-03 PROCEDURE — 2580000003 HC RX 258: Performed by: OPHTHALMOLOGY

## 2023-01-03 PROCEDURE — 2709999900 HC NON-CHARGEABLE SUPPLY: Performed by: OPHTHALMOLOGY

## 2023-01-03 PROCEDURE — 3700000001 HC ADD 15 MINUTES (ANESTHESIA): Performed by: OPHTHALMOLOGY

## 2023-01-03 PROCEDURE — 6370000000 HC RX 637 (ALT 250 FOR IP): Performed by: OPHTHALMOLOGY

## 2023-01-03 PROCEDURE — 3600000014 HC SURGERY LEVEL 4 ADDTL 15MIN: Performed by: OPHTHALMOLOGY

## 2023-01-03 PROCEDURE — 2580000003 HC RX 258: Performed by: ANESTHESIOLOGY

## 2023-01-03 PROCEDURE — 3700000000 HC ANESTHESIA ATTENDED CARE: Performed by: OPHTHALMOLOGY

## 2023-01-03 RX ORDER — DIPHENHYDRAMINE HYDROCHLORIDE 50 MG/ML
12.5 INJECTION INTRAMUSCULAR; INTRAVENOUS
Status: DISCONTINUED | OUTPATIENT
Start: 2023-01-03 | End: 2023-01-03 | Stop reason: HOSPADM

## 2023-01-03 RX ORDER — WATER 1000 ML/1000ML
INJECTION, SOLUTION INTRAVENOUS PRN
Status: DISCONTINUED | OUTPATIENT
Start: 2023-01-03 | End: 2023-01-03 | Stop reason: ALTCHOICE

## 2023-01-03 RX ORDER — SODIUM CHLORIDE 9 MG/ML
INJECTION, SOLUTION INTRAVENOUS PRN
Status: DISCONTINUED | OUTPATIENT
Start: 2023-01-03 | End: 2023-01-03 | Stop reason: HOSPADM

## 2023-01-03 RX ORDER — ERYTHROMYCIN 5 MG/G
OINTMENT OPHTHALMIC PRN
Status: DISCONTINUED | OUTPATIENT
Start: 2023-01-03 | End: 2023-01-03 | Stop reason: ALTCHOICE

## 2023-01-03 RX ORDER — TOBRAMYCIN AND DEXAMETHASONE 3; 1 MG/ML; MG/ML
1 SUSPENSION/ DROPS OPHTHALMIC
Status: COMPLETED | OUTPATIENT
Start: 2023-01-03 | End: 2023-01-03

## 2023-01-03 RX ORDER — TROPICAMIDE 10 MG/ML
SOLUTION/ DROPS OPHTHALMIC PRN
Status: DISCONTINUED | OUTPATIENT
Start: 2023-01-03 | End: 2023-01-03 | Stop reason: ALTCHOICE

## 2023-01-03 RX ORDER — MORPHINE SULFATE 2 MG/ML
2 INJECTION, SOLUTION INTRAMUSCULAR; INTRAVENOUS EVERY 5 MIN PRN
Status: DISCONTINUED | OUTPATIENT
Start: 2023-01-03 | End: 2023-01-03 | Stop reason: HOSPADM

## 2023-01-03 RX ORDER — FENTANYL CITRATE 50 UG/ML
25 INJECTION, SOLUTION INTRAMUSCULAR; INTRAVENOUS EVERY 5 MIN PRN
Status: DISCONTINUED | OUTPATIENT
Start: 2023-01-03 | End: 2023-01-03 | Stop reason: HOSPADM

## 2023-01-03 RX ORDER — FENTANYL CITRATE 50 UG/ML
INJECTION, SOLUTION INTRAMUSCULAR; INTRAVENOUS PRN
Status: DISCONTINUED | OUTPATIENT
Start: 2023-01-03 | End: 2023-01-03 | Stop reason: SDUPTHER

## 2023-01-03 RX ORDER — PROPOFOL 10 MG/ML
INJECTION, EMULSION INTRAVENOUS PRN
Status: DISCONTINUED | OUTPATIENT
Start: 2023-01-03 | End: 2023-01-03 | Stop reason: SDUPTHER

## 2023-01-03 RX ORDER — BALANCED SALT SOLUTION ENRICHED WITH BICARBONATE, DEXTROSE, AND GLUTATHIONE
KIT INTRAOCULAR PRN
Status: DISCONTINUED | OUTPATIENT
Start: 2023-01-03 | End: 2023-01-03 | Stop reason: ALTCHOICE

## 2023-01-03 RX ORDER — CEFTAZIDIME 1 G/1
INJECTION, POWDER, FOR SOLUTION INTRAMUSCULAR; INTRAVENOUS PRN
Status: DISCONTINUED | OUTPATIENT
Start: 2023-01-03 | End: 2023-01-03 | Stop reason: ALTCHOICE

## 2023-01-03 RX ORDER — PHENYLEPHRINE HYDROCHLORIDE 100 MG/ML
1 SOLUTION/ DROPS OPHTHALMIC EVERY 5 MIN PRN
Status: COMPLETED | OUTPATIENT
Start: 2023-01-03 | End: 2023-01-03

## 2023-01-03 RX ORDER — SODIUM CHLORIDE 0.9 % (FLUSH) 0.9 %
5-40 SYRINGE (ML) INJECTION PRN
Status: DISCONTINUED | OUTPATIENT
Start: 2023-01-03 | End: 2023-01-03 | Stop reason: HOSPADM

## 2023-01-03 RX ORDER — MIDAZOLAM HYDROCHLORIDE 1 MG/ML
INJECTION INTRAMUSCULAR; INTRAVENOUS PRN
Status: DISCONTINUED | OUTPATIENT
Start: 2023-01-03 | End: 2023-01-03 | Stop reason: SDUPTHER

## 2023-01-03 RX ORDER — ONDANSETRON 2 MG/ML
4 INJECTION INTRAMUSCULAR; INTRAVENOUS
Status: DISCONTINUED | OUTPATIENT
Start: 2023-01-03 | End: 2023-01-03 | Stop reason: HOSPADM

## 2023-01-03 RX ORDER — SODIUM CHLORIDE 0.9 % (FLUSH) 0.9 %
5-40 SYRINGE (ML) INJECTION EVERY 12 HOURS SCHEDULED
Status: DISCONTINUED | OUTPATIENT
Start: 2023-01-03 | End: 2023-01-03 | Stop reason: HOSPADM

## 2023-01-03 RX ORDER — SODIUM CHLORIDE, SODIUM LACTATE, POTASSIUM CHLORIDE, CALCIUM CHLORIDE 600; 310; 30; 20 MG/100ML; MG/100ML; MG/100ML; MG/100ML
INJECTION, SOLUTION INTRAVENOUS CONTINUOUS
Status: DISCONTINUED | OUTPATIENT
Start: 2023-01-03 | End: 2023-01-03 | Stop reason: HOSPADM

## 2023-01-03 RX ORDER — TROPICAMIDE 10 MG/ML
1 SOLUTION/ DROPS OPHTHALMIC EVERY 5 MIN PRN
Status: COMPLETED | OUTPATIENT
Start: 2023-01-03 | End: 2023-01-03

## 2023-01-03 RX ADMIN — PHENYLEPHRINE HYDROCHLORIDE 1 DROP: 100 SOLUTION/ DROPS OPHTHALMIC at 06:54

## 2023-01-03 RX ADMIN — MIDAZOLAM 1 MG: 1 INJECTION INTRAMUSCULAR; INTRAVENOUS at 07:52

## 2023-01-03 RX ADMIN — TROPICAMIDE 1 DROP: 10 SOLUTION/ DROPS OPHTHALMIC at 06:48

## 2023-01-03 RX ADMIN — TROPICAMIDE 1 DROP: 10 SOLUTION/ DROPS OPHTHALMIC at 06:43

## 2023-01-03 RX ADMIN — SODIUM CHLORIDE, POTASSIUM CHLORIDE, SODIUM LACTATE AND CALCIUM CHLORIDE: 600; 310; 30; 20 INJECTION, SOLUTION INTRAVENOUS at 06:55

## 2023-01-03 RX ADMIN — FENTANYL CITRATE 25 MCG: 50 INJECTION, SOLUTION INTRAMUSCULAR; INTRAVENOUS at 07:59

## 2023-01-03 RX ADMIN — MIDAZOLAM 1 MG: 1 INJECTION INTRAMUSCULAR; INTRAVENOUS at 07:41

## 2023-01-03 RX ADMIN — PHENYLEPHRINE HYDROCHLORIDE 1 DROP: 100 SOLUTION/ DROPS OPHTHALMIC at 06:43

## 2023-01-03 RX ADMIN — PROPOFOL 70 MG: 10 INJECTION, EMULSION INTRAVENOUS at 07:45

## 2023-01-03 RX ADMIN — FENTANYL CITRATE 50 MCG: 50 INJECTION, SOLUTION INTRAMUSCULAR; INTRAVENOUS at 08:02

## 2023-01-03 RX ADMIN — TROPICAMIDE 1 DROP: 10 SOLUTION/ DROPS OPHTHALMIC at 06:54

## 2023-01-03 RX ADMIN — TOBRAMYCIN AND DEXAMETHASONE 1 DROP: 3; 1 SUSPENSION/ DROPS OPHTHALMIC at 06:43

## 2023-01-03 RX ADMIN — PHENYLEPHRINE HYDROCHLORIDE 1 DROP: 100 SOLUTION/ DROPS OPHTHALMIC at 06:48

## 2023-01-03 RX ADMIN — FENTANYL CITRATE 25 MCG: 50 INJECTION, SOLUTION INTRAMUSCULAR; INTRAVENOUS at 07:52

## 2023-01-03 ASSESSMENT — PAIN - FUNCTIONAL ASSESSMENT: PAIN_FUNCTIONAL_ASSESSMENT: 0-10

## 2023-01-03 NOTE — INTERVAL H&P NOTE
Pt Name: Karyna Atkinson  MRN: [id number]  [place]: 1965  Date of evaluation: 1/3/2023    I have reviewed the patient's history and physical examination completed on 12/20/2022. No changes to history or on examination today, unless noted below. Patient reports right eye is better; left eye continues to have \"backwards C\" appearance with floaters.      Lola Howell, CAITY - CNP  1/3/23  6:52 AM

## 2023-01-03 NOTE — PROGRESS NOTES
RN called and spoke to pts ride home, Adal Levi. Stated she will be here around 9:45am after kid gets on bus.    [phone]

## 2023-01-03 NOTE — ANESTHESIA POSTPROCEDURE EVALUATION
Department of Anesthesiology  Postprocedure Note    Patient: Reese Whitaker  MRN: [id number]  YOB: 1965  Date of evaluation: 1/3/2023      Procedure Summary     Date: 01/03/23 Room / Location: [address]    Anesthesia Start: 3688 Anesthesia Stop: 4261    Procedure: VITRECTOMY 25 GAUGE (Left: Eye) Diagnosis:       Vitreous opacities of left eye      (VITROUS OPACITIES LEFT EYE)    Surgeons: Jose Antonio Gomez MD Responsible Provider: Georgiana Giron MD    Anesthesia Type: MAC ASA Status: 3          Anesthesia Type: No value filed.     Dago Phase I:      Dago Phase II: Dago Score: 9      Anesthesia Post Evaluation    Patient location during evaluation: PACU  Patient participation: complete - patient participated  Level of consciousness: awake and alert  Pain score: 0  Nausea & Vomiting: no nausea  Cardiovascular status: hemodynamically stable  Respiratory status: room air

## 2023-01-03 NOTE — ANESTHESIA PRE PROCEDURE
Department of Anesthesiology  Preprocedure Note       Name:  Conor Gutierrez   Age:  59 y.o.  :  1965                                          MRN:  [id number]         Date:  1/3/2023      Surgeon: Mavis Gutierrez):  Gisela Comer MD    Procedure: Procedure(s):  VITRECTOMY 25 GAUGE    Medications prior to admission:   Prior to Admission medications    Medication Sig Start Date End Date Taking? Authorizing Provider   celecoxib (CELEBREX) 200 MG capsule Take 200 mg by mouth daily 22   Historical Provider, MD   citalopram (CELEXA) 40 MG tablet Take 40 mg by mouth daily    Historical Provider, MD   sulfaSALAzine (AZULFIDINE) 500 MG tablet Take 500 mg by mouth 4 times daily    Historical Provider, MD   DULoxetine (CYMBALTA) 30 MG extended release capsule Take 30 mg by mouth daily    Historical Provider, MD   aspirin 81 MG chewable tablet Take 162 mg by mouth daily    Historical Provider, MD   esomeprazole (NEXIUM) 40 MG delayed release capsule Nexium 40 mg capsule,delayed release   Take 1 capsule every day by oral route. 17   Historical Provider, MD   levothyroxine (SYNTHROID) 150 MCG tablet Take 150 mcg by mouth Daily 17   Historical Provider, MD   topiramate (TOPAMAX) 50 MG tablet Topamax 50 mg tablet   Take 1 tablet twice a day by oral route. Historical Provider, MD       Current medications:    No current facility-administered medications for this visit. No current outpatient medications on file.      Facility-Administered Medications Ordered in Other Visits   Medication Dose Route Frequency Provider Last Rate Last Admin    tobramycin-dexamethasone (TOBRADEX) ophthalmic suspension 1 drop  1 drop Left Eye On Call to [address] Leon Galindo MD        tropicamide (MYDRIACYL) 1 % ophthalmic solution 1 drop  1 drop Left Eye Q5 Min PRN Gisela Comer MD        phenylephrine (SHERLY-SYNEPHRINE) 10 % ophthalmic solution 1 drop  1 drop Left Eye Q5 Min PRN Gisela Comer MD           Allergies:  No Known Allergies    Problem List:    Patient Active Problem List   Diagnosis Code    Acute medial meniscal tear, right, sequela S83.241S    Acute pain of right shoulder M25.511    Anemia D64.9    Arthritis M19.90    Backache M54.9    Breast disorder N64.9    Breast nodule N63.0    Calculus of gallbladder with cholecystitis K80.10    Cataract H26.9    Cyst of ovary N83.209    Deep vein thrombosis ([facility] [address] I82.409    Depression F32. A    Diabetes mellitus type 2, controlled ([facility] [address] E11.9    NICOLAS (dyspnea on exertion) R06.09    Eczema L30.9    Excessive daytime sleepiness G47.19    Fatigue R53.83    Flat foot M21.40    Gastroesophageal reflux disease K21.9    History of deep venous thrombosis Z86.718    History of pulmonary embolism Z86.711    History of tick-borne disease Z86.19    Hypothyroidism E03.9    Impingement syndrome of right shoulder M75.41    Injury of back S39. 92XA    Menorrhagia N92.0    Migraine headache G43.909    Pain in joint M25.50    Myalgia M79.10    Nausea R11.0    Numbness and tingling R20.0, R20.2    Obesity E66.9    Overactive bladder N32.81    Palpitations R00.2    Postoperative hypothyroidism E89.0    Primary osteoarthritis of right knee M17.11    Pulmonary embolism (HCC) I26.99    Right knee pain M25.561    Right upper quadrant pain R10.11    Subacromial bursitis M75.50    Tendinopathy of right rotator cuff M67.911    Uterine leiomyoma D25.9    Visual impairment H54.7    Vitamin D deficiency E55.9    Vitreous opacities of right eye H43.391       Past Medical History:        Diagnosis Date    Acid reflux     Arthritis     Back pain     Breast nodule     breast tag    Bursitis of shoulder region     bilat    Carpal tunnel syndrome     bilat    Cataract     Depression     Floaters     History of [place] spotted fever 1995    tick borne illness    Hx of blood clots 2015    DVT and PE after surgery    Leg pain     Migraine     ALO on CPAP 11/23/2022    has not used CPAP for past month    Ovarian cyst 2015    Overactive bladder     Spinal stenosis     Thyroid disease     Torn meniscus     Under care of service provider 12/16/2022    rheumatology-[facility] Norman-last visit april 2022    Under care of service provider 12/16/2022    pcp-Miki Hargrove-last visit sep 2022    Under care of service provider 12/16/2022    vascular-Freddie Hargrove vascular-last visit oct 2022    Under care of service provider 12/16/2022    ortho-ink[facility]-last visit oct 2022    Uterine leiomyoma 2015    Venous insufficiency     Visual disturbance     cloudy vision       Past Surgical History:        Procedure Laterality Date    CATARACT REMOVAL  2021    CHOLECYSTECTOMY  2013    COLONOSCOPY      ENDOSCOPY, COLON, DIAGNOSTIC      JOINT REPLACEMENT Right 2018    knee    JOINT REPLACEMENT Left 05/2020    knee    KNEE CARTILAGE SURGERY Right 2018    repair of torn meniscus    KNEE CARTILAGE SURGERY Left 11/2019    torn meniscus    SINUS SURGERY  1999    THYROIDECTOMY  2010    TUBAL LIGATION  1993    UTERINE FIBROID EMBOLIZATION  2015    VITRECTOMY Right 12/20/2022    VITRECTOMY Right 12/20/2022    VITRECTOMY 25 GAUGE performed by Lisa Ocampo MD at [address] [place] [address] History:    Social History     Tobacco Use    Smoking status: Never    Smokeless tobacco: Never   Substance Use Topics    Alcohol use: Not Currently                                Counseling given: Not Answered      Vital Signs (Current): There were no vitals filed for this visit.                                            BP Readings from Last 3 Encounters:   12/20/22 138/74       NPO Status:  MN                                                                               BMI:   Wt Readings from Last 3 Encounters:   12/30/22 220 lb (99.8 kg)   12/16/22 220 lb (99.8 kg)   03/07/19 215 lb (97.5 kg)     There is no height or weight on file to calculate BMI.    CBC:   Lab Results   Component Value Date/Time    WBC 4.40 05/11/2020 02:08 PM    RBC 4.83 05/11/2020 02:08 PM    HGB 13.2 05/11/2020 02:08 PM    HCT 41.6 05/11/2020 02:08 PM    MCV 86.1 05/11/2020 02:08 PM    RDW 12.5 05/11/2020 02:08 PM     05/11/2020 02:08 PM       CMP:   Lab Results   Component Value Date/Time     05/11/2020 02:08 PM    K 3.9 05/11/2020 02:08 PM     05/11/2020 02:08 PM    CO2 22 05/11/2020 02:08 PM    BUN 10 05/11/2020 02:08 PM    CREATININE 0.75 12/20/2022 08:55 AM    CREATININE 0.89 05/11/2020 02:08 PM    GLUCOSE 99 05/11/2020 02:08 PM    PROT 6.9 10/18/2019 02:38 PM    CALCIUM 9.2 05/11/2020 02:08 PM    BILITOT 0.6 10/18/2019 02:38 PM    ALKPHOS 85 10/18/2019 02:38 PM    AST 19 10/18/2019 02:38 PM    ALT 18 10/18/2019 02:38 PM       POC Tests: No results for input(s): POCGLU, POCNA, POCK, POCCL, POCBUN, POCHEMO, POCHCT in the last 72 hours.     Coags:   Lab Results   Component Value Date/Time    PROTIME 13.4 05/11/2020 02:08 PM    INR 1.02 05/11/2020 02:08 PM    APTT 25.4 05/11/2020 02:08 PM       HCG (If Applicable): No results found for: PREGTESTUR, PREGSERUM, HCG, HCGQUANT     ABGs: No results found for: PHART, PO2ART, YRW5ZBL, JVR0TJE, BEART, C1IGHKST     Type & Screen (If Applicable):  No results found for: LABABO, LABRH    Drug/Infectious Status (If Applicable):  No results found for: HIV, HEPCAB    COVID-19 Screening (If Applicable):   Lab Results   Component Value Date/Time    COVID19 Not Detected 05/11/2020 02:07 PM           Anesthesia Evaluation  Patient summary reviewed and Nursing notes reviewed no history of anesthetic complications:   Airway: Mallampati: II  TM distance: >3 FB   Neck ROM: full  Mouth opening: > = 3 FB   Dental: normal exam         Pulmonary: breath sounds clear to auscultation  (+) sleep apnea: on CPAP,                            ROS comment: PE,DVT   Cardiovascular:  Exercise tolerance: good (>4 METS),       (-)  NICOLAS      Rhythm: regular  Rate: normal                    Neuro/Psych:   (+) neuromuscular disease:, headaches: migraine headaches, psychiatric history:            GI/Hepatic/Renal:   (+) GERD:, morbid obesity          Endo/Other:    (+) hypothyroidism::., .    (-) diabetes mellitus               Abdominal:             Vascular:   + DVT (hx of), PE (hx of). Other Findings:             Anesthesia Plan      MAC     ASA 3       Induction: intravenous.                             Stephanie Story MD   1/3/2023

## 2023-01-03 NOTE — OP NOTE
Operative Note      Patient: Joseph Flowers  YOB: 1965  MRN: [id number]    Date of Procedure: 1/3/2023    Pre-operative Diagnosis: Vitreous Opacities Left Eye    Post-operative Diagnosis: Same    Procedure: Vitrectomy,  Left Eye    Anesthesia: MAC    Surgeons/Assistants: Vaughn Steel MD    Estimated Blood Loss: Less than 1 ML    Complications: None    Specimens: None    Reason for operation:  The patient has significant vitreous opacities that are interfering with daily activities. Patient wishes to have surgery to remove opacities in anticipation of better vision. Patient understands risks of surgery include, but are not limited to, bleeding, infection and retinal detachment. Procedure: The patient was brought to the operating room in good condition. Transient Propofol was given. Retrobulbar and topical anaesthesia were administered. The patient was prepped and draped in the usual manner. 25 gauge vitrectomy trocars were inserted in the usual quadrants. Infusion was inserted in the inferior temporal quadrant. Light pipe and ocutome placed through the superior trocars. Vitreous was removed from anterior to posterior and trimmed out past the equator in all quadrants. No significant bleeding occurred at any time. No tears or detachment were found with scleral depression. The trocars were removed and sutured if there any leakage. Subtenon's antibiotic was injected in the inferior nasal quadrant. The eye was patched in the usual fashion and the patient taken to the recovery room in good condition.

## (undated) DEVICE — SURGICAL PROCEDURE PACK 25 GA VITRECTOMY

## (undated) DEVICE — 25 GA VALVED ENTRY SYSTEM, 3-CT: Brand: ALCON

## (undated) DEVICE — RETINA PK

## (undated) DEVICE — GLOVE SURG SZ 65 THK91MIL LTX FREE SYN POLYISOPRENE

## (undated) DEVICE — GLOVE ORANGE PI 7   MSG9070

## (undated) DEVICE — SUTURE PLN GUT SZ 6-0 L18IN ABSRB YELLOWISH TAN L6.5MM 1735G

## (undated) DEVICE — 1 EACH 40411 STERILE DISPOSABLE SUPER VIEW® LENS SET & 1 EACH 40100 STERILE MICROSCOPE DRAPE: Brand: SUPER VIEW® PACK

## (undated) DEVICE — SYRINGE, LUER LOCK, 10ML: Brand: MEDLINE

## (undated) DEVICE — 25GA EZPASS SOFT TIP CANNULA BOX OF 5: Brand: VORTEX SURGICAL INC

## (undated) DEVICE — STRIP,CLOSURE,WOUND,MEDI-STRIP,1/2X4: Brand: MEDLINE

## (undated) DEVICE — SYRINGE MED 5ML STD CLR PLAS LUERLOCK TIP N CTRL DISP

## (undated) DEVICE — OCCUCOAT SYRINGE 1ML 6PK: Brand: OCCUCOAT

## (undated) DEVICE — STANDARD HYPODERMIC NEEDLE,ALUMINUM HUB: Brand: MONOJECT

## (undated) DEVICE — CYCLOGEL 1% GTTS